# Patient Record
Sex: FEMALE | Race: WHITE
[De-identification: names, ages, dates, MRNs, and addresses within clinical notes are randomized per-mention and may not be internally consistent; named-entity substitution may affect disease eponyms.]

---

## 2019-09-09 ENCOUNTER — HOSPITAL ENCOUNTER (EMERGENCY)
Dept: HOSPITAL 72 - EMR | Age: 29
LOS: 3 days | Discharge: PSYCHIATRIC OR SUBSTANCE USE FACILITY | End: 2019-09-12
Payer: SELF-PAY

## 2019-09-09 VITALS — DIASTOLIC BLOOD PRESSURE: 92 MMHG | SYSTOLIC BLOOD PRESSURE: 113 MMHG

## 2019-09-09 VITALS — SYSTOLIC BLOOD PRESSURE: 155 MMHG | DIASTOLIC BLOOD PRESSURE: 98 MMHG

## 2019-09-09 VITALS — SYSTOLIC BLOOD PRESSURE: 147 MMHG | DIASTOLIC BLOOD PRESSURE: 104 MMHG

## 2019-09-09 VITALS — DIASTOLIC BLOOD PRESSURE: 93 MMHG | SYSTOLIC BLOOD PRESSURE: 147 MMHG

## 2019-09-09 VITALS — DIASTOLIC BLOOD PRESSURE: 90 MMHG | SYSTOLIC BLOOD PRESSURE: 128 MMHG

## 2019-09-09 VITALS — SYSTOLIC BLOOD PRESSURE: 143 MMHG | DIASTOLIC BLOOD PRESSURE: 93 MMHG

## 2019-09-09 VITALS — SYSTOLIC BLOOD PRESSURE: 141 MMHG | DIASTOLIC BLOOD PRESSURE: 95 MMHG

## 2019-09-09 VITALS — DIASTOLIC BLOOD PRESSURE: 89 MMHG | SYSTOLIC BLOOD PRESSURE: 129 MMHG

## 2019-09-09 VITALS — HEIGHT: 66 IN | WEIGHT: 130 LBS | BODY MASS INDEX: 20.89 KG/M2

## 2019-09-09 VITALS — DIASTOLIC BLOOD PRESSURE: 97 MMHG | SYSTOLIC BLOOD PRESSURE: 146 MMHG

## 2019-09-09 VITALS — SYSTOLIC BLOOD PRESSURE: 113 MMHG | DIASTOLIC BLOOD PRESSURE: 92 MMHG

## 2019-09-09 VITALS — SYSTOLIC BLOOD PRESSURE: 139 MMHG | DIASTOLIC BLOOD PRESSURE: 97 MMHG

## 2019-09-09 DIAGNOSIS — F10.920: ICD-10-CM

## 2019-09-09 DIAGNOSIS — N30.00: ICD-10-CM

## 2019-09-09 DIAGNOSIS — F15.10: ICD-10-CM

## 2019-09-09 DIAGNOSIS — R45.850: ICD-10-CM

## 2019-09-09 DIAGNOSIS — R45.851: ICD-10-CM

## 2019-09-09 DIAGNOSIS — F23: Primary | ICD-10-CM
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
## 2019-09-09 DIAGNOSIS — F14.10: ICD-10-CM

## 2019-09-09 LAB
ADD MANUAL DIFF: NO
ALBUMIN SERPL-MCNC: 3.8 G/DL (ref 3.4–5)
ALBUMIN/GLOB SERPL: 0.9 {RATIO} (ref 1–2.7)
ALP SERPL-CCNC: 67 U/L (ref 46–116)
ALT SERPL-CCNC: 38 U/L (ref 12–78)
ANION GAP SERPL CALC-SCNC: 12 MMOL/L (ref 5–15)
AST SERPL-CCNC: 142 U/L (ref 15–37)
BASOPHILS NFR BLD AUTO: 1.4 % (ref 0–2)
BILIRUB SERPL-MCNC: 0.2 MG/DL (ref 0.2–1)
BUN SERPL-MCNC: 4 MG/DL (ref 7–18)
CALCIUM SERPL-MCNC: 8.8 MG/DL (ref 8.5–10.1)
CHLORIDE SERPL-SCNC: 110 MMOL/L (ref 98–107)
CO2 SERPL-SCNC: 24 MMOL/L (ref 21–32)
CREAT SERPL-MCNC: 0.6 MG/DL (ref 0.55–1.3)
EOSINOPHIL NFR BLD AUTO: 6.1 % (ref 0–3)
ERYTHROCYTE [DISTWIDTH] IN BLOOD BY AUTOMATED COUNT: 9.7 % (ref 11.6–14.8)
GLOBULIN SER-MCNC: 4.3 G/DL
HCT VFR BLD CALC: 38.5 % (ref 37–47)
HGB BLD-MCNC: 13.9 G/DL (ref 12–16)
LYMPHOCYTES NFR BLD AUTO: 37.1 % (ref 20–45)
MCV RBC AUTO: 97 FL (ref 80–99)
MONOCYTES NFR BLD AUTO: 8.4 % (ref 1–10)
NEUTROPHILS NFR BLD AUTO: 46.9 % (ref 45–75)
PLATELET # BLD: 229 K/UL (ref 150–450)
POTASSIUM SERPL-SCNC: 3.4 MMOL/L (ref 3.5–5.1)
RBC # BLD AUTO: 3.95 M/UL (ref 4.2–5.4)
SODIUM SERPL-SCNC: 146 MMOL/L (ref 136–145)
WBC # BLD AUTO: 5.1 K/UL (ref 4.8–10.8)

## 2019-09-09 PROCEDURE — 87086 URINE CULTURE/COLONY COUNT: CPT

## 2019-09-09 PROCEDURE — 80329 ANALGESICS NON-OPIOID 1 OR 2: CPT

## 2019-09-09 PROCEDURE — 80053 COMPREHEN METABOLIC PANEL: CPT

## 2019-09-09 PROCEDURE — 36415 COLL VENOUS BLD VENIPUNCTURE: CPT

## 2019-09-09 PROCEDURE — 96372 THER/PROPH/DIAG INJ SC/IM: CPT

## 2019-09-09 PROCEDURE — 81003 URINALYSIS AUTO W/O SCOPE: CPT

## 2019-09-09 PROCEDURE — 99285 EMERGENCY DEPT VISIT HI MDM: CPT

## 2019-09-09 PROCEDURE — 85025 COMPLETE CBC W/AUTO DIFF WBC: CPT

## 2019-09-09 PROCEDURE — 81025 URINE PREGNANCY TEST: CPT

## 2019-09-09 PROCEDURE — 80307 DRUG TEST PRSMV CHEM ANLYZR: CPT

## 2019-09-09 NOTE — NUR
ED Nurse Note:



pt grew agitatd and began cursing at staff and acting combative. attemtped to 
give calm pt with verbal cues, this was unsuccessful

## 2019-09-09 NOTE — NUR
ED Nurse Note:



Informed ermd that pt was unable to make urine. ERMD aware. will await further 
orders

## 2019-09-09 NOTE — EMERGENCY ROOM REPORT
History of Present Illness


General


Chief Complaint:  Behavioral Complaint


Source:  Patient, EMS, Law Enforcement


 (Tesfaye Whipple MD)





Present Illness


HPI


Patient is a 29-year-old female brought in by EMS with [employer].  Patient 

reportedly had been behaving increasingly bizarre over the past few days.  

Patient had recently traveled to [place].  She had been noted to be more 

agitated than usual and apparently had been drinking alcohol heavily earlier in 

the day.  History is markedly limited by poor patient cooperation.  Patient was 

noted to been placed on a 5150 hold by [employer] for some reported danger self and 

to others.  Apparently the patient has made some threatening statements in 

English.


 (Tesfaye Whipple MD)


Allergies:  


Coded Allergies:  


     No Known Allergies (Unverified , 9/9/19)





Patient History


Past Medical History:  see triage record


Pregnant Now:  No


Reviewed Nursing Documentation:  PMH: Agreed; PSxH: Agreed (Tesfaye Whipple MD)





Nursing Documentation-PMH


Past Medical History Deferred:  Pt Cognitively Impaired


Past Medical History:  No History, Except For


History Of Psychiatric Problem:  Yes


 (Tesfaye Whipple MD)





Review of Systems


All Other Systems:  limited - by poor historian


 (Tesfaye Whipple MD)





Physical Exam





Vital Signs








  Date Time  Temp Pulse Resp B/P (MAP) Pulse Ox O2 Delivery O2 Flow Rate FiO2


 


9/9/19 19:54 98.1 99 18 147/104 (118) 94 Room Air  








Sp02 EP Interpretation:  reviewed, normal


General Appearance:  alert/responsive, no apparent distress, GCS 15, non-toxic


Head:  atraumatic


Eyes:  PERRL, lids + conjunctiva normal


ENT:  hearing intact, no angioedema


Neck:  supple/symm/no masses, no meningismus


Respiratory:  effort normal, no wheezing, chest symmetrical


Cardiovascular:  regular rate, rhythm, no edema


Cardiovascular #2:  2+ carotid (R), 2+ carotid (L), 2+ dorsalis pedis (R), 2+ 

dorsalis pedis (L)


Gastrointestinal:  non-tender, no mass, non-distended, no rebound/guarding, 

normal bowel sounds


Musculoskeletal:  gait & station normal, strength & tone normal, normal ROM, non

-tender


Neurologic:  normal inspection, CN II-XII intact, oriented x3, sensory intact, 

normal speech, other - slurred speech


Skin:  no rash, well hydrated


Lymphatic:  normal inspection


 (Tesfaye Whipple MD)





Medical Decision Making


Diagnostic Impression:  


 Primary Impression:  


 Alcohol intoxication


 Qualified Codes:  F10.920 - Alcohol use, unspecified with intoxication, 

uncomplicated


 Additional Impressions:  


 Psychosis


 Qualified Codes:  F23 - Brief psychotic disorder


 UTI (urinary tract infection)


 Qualified Codes:  N30.00 - Acute cystitis without hematuria


 Cocaine abuse


 Methamphetamine abuse


ER Course


 Patient presented for increased bizarre behavior.  Differential diagnoses 

include substance abuse, psychosis, bipolar disorder, depression, malingering.  

Patient was noted to have prior history of because of complexity of patient's 

case laboratory tests and imaging studies were ordered.  Alcohol use.  Blood 

alcohol was noted to be markedly elevated laboratory testing.  Patient will 

require repeat blood alcohol testing prior to medical clearance for psychiatric 

placement.She was given Haldol due to agitation briefly placed in restraints.





Labs








Test


  9/9/19


20:20


 


White Blood Count


  5.1 K/UL


(4.8-10.8)


 


Red Blood Count


  3.95 M/UL


(4.20-5.40)


 


Hemoglobin


  13.9 G/DL


(12.0-16.0)


 


Hematocrit


  38.5 %


(37.0-47.0)


 


Mean Corpuscular Volume 97 FL (80-99) 


 


Mean Corpuscular Hemoglobin


  35.3 PG


(27.0-31.0)


 


Mean Corpuscular Hemoglobin


Concent 36.2 G/DL


(32.0-36.0)


 


Red Cell Distribution Width


  9.7 %


(11.6-14.8)


 


Platelet Count


  229 K/UL


(150-450)


 


Mean Platelet Volume


  6.6 FL


(6.5-10.1)


 


Neutrophils (%) (Auto)


  46.9 %


(45.0-75.0)


 


Lymphocytes (%) (Auto)


  37.1 %


(20.0-45.0)


 


Monocytes (%) (Auto)


  8.4 %


(1.0-10.0)


 


Eosinophils (%) (Auto)


  6.1 %


(0.0-3.0)


 


Basophils (%) (Auto)


  1.4 %


(0.0-2.0)


 


Sodium Level


  146 MMOL/L


(136-145)


 


Potassium Level


  3.4 MMOL/L


(3.5-5.1)


 


Chloride Level


  110 MMOL/L


()


 


Carbon Dioxide Level


  24 MMOL/L


(21-32)


 


Anion Gap


  12 mmol/L


(5-15)


 


Blood Urea Nitrogen 4 mg/dL (7-18) 


 


Creatinine


  0.6 MG/DL


(0.55-1.30)


 


Estimat Glomerular Filtration


Rate > 60 mL/min


(>60)


 


Glucose Level


  109 MG/DL


()


 


Calcium Level


  8.8 MG/DL


(8.5-10.1)


 


Total Bilirubin


  0.2 MG/DL


(0.2-1.0)


 


Aspartate Amino Transf


(AST/SGOT) 142 U/L


(15-37)


 


Alanine Aminotransferase


(ALT/SGPT) 38 U/L (12-78) 


 


 


Alkaline Phosphatase


  67 U/L


()


 


Total Protein


  8.1 G/DL


(6.4-8.2)


 


Albumin


  3.8 G/DL


(3.4-5.0)


 


Globulin 4.3 g/dL 


 


Albumin/Globulin Ratio 0.9 (1.0-2.7) 


 


Salicylates Level


  0.9 ug/mL


(2.8-20)


 


Acetaminophen Level


  < 2 MCG/ML


(10-30)


 


Serum Alcohol 341 mg/dL 








 (Tesfaye Whipple MD)


ER Course


This patient was signed out to me.  She was placed on a 5150 by police for 

danger to self and others.  Currently, she is in English that she wants to kill 

herself and other people.  She slept through the night.  She she is walking 

around now without any difficulty.  Will need to repeat blood alcohol for 

medical clearance.  She is no longer suicidal or homicidal.  Since she is on a 

5150 hold, once medically clear can be sent to her psychiatric facility or 

evaluated by psychiatrist here for removal of 5150.


 (Cody Hung MD)


ER Course


Patient was signed out to me for final disposition.  I have evaluated patient.  

Patient is currently medically cleared for psychiatric evaluation and 

admission.  Patient is currently on a 5150.


 (Joesph Marr MD)


ER Course


Please see above note.


Patient had been accepted at [facility] but name on 5150 was incorrect.


[employer] here correcting 5150.


Patient calm and stable for transfer.


 (Antonio Christensen MD)





Last Vital Signs








  Date Time  Temp Pulse Resp B/P (MAP) Pulse Ox O2 Delivery O2 Flow Rate FiO2


 


9/9/19 21:42  99 19  98 Room Air  


 


9/9/19 20:00 98.1   147/104    








Status:  improved


 (Tesfaye Whipple MD)


Status:  improved


 (Cody Hung MD)





Last Vital Signs








  Date Time  Temp Pulse Resp B/P (MAP) Pulse Ox O2 Delivery O2 Flow Rate FiO2


 


9/12/19 00:15 98.1 68 16 135/86 98 Room Air  


 


9/11/19 23:08        88








Status:  improved


 (Antonio Christensen MD)


Disposition:  XFER TO PSYCH HOSP/UNIT


Condition:  Stable


Scripts


Cephalexin* (KEFLEX*) 500 Mg Capsule


500 MG ORAL TID, #21 CAP


   Prov: Cody Hung MD         9/10/19


Referrals:  


NOT CHOSEN IPA/MD,REFERRING (PCP)











Tesfaye Whipple MD Sep 9, 2019 22:23


Cody Hung MD Sep 10, 2019 03:39


Joesph Marr MD Sep 10, 2019 08:06


Antonio Christensen MD Sep 11, 2019 18:32

## 2019-09-09 NOTE — NUR
ED Nurse Note:



pt intensified loudness of voice and began removing clothing agressively. emt, 
house supervisor, and security were at bedside. pt is unconsolable. awaiting 
further orders from ermd

## 2019-09-09 NOTE — NUR
-------------------------------------------------------------------------------

           *** Note brenda in EDM - 09/10/19 at 0619 by ELVIA ***            

-------------------------------------------------------------------------------

ED Nurse Note:



pt intensified loudness of voice and began removing clothing agressively. emt, 
house supervisor, and security were at bedside. pt is unconsolable. [facility]

## 2019-09-10 VITALS — SYSTOLIC BLOOD PRESSURE: 133 MMHG | DIASTOLIC BLOOD PRESSURE: 84 MMHG

## 2019-09-10 VITALS — DIASTOLIC BLOOD PRESSURE: 78 MMHG | SYSTOLIC BLOOD PRESSURE: 140 MMHG

## 2019-09-10 VITALS — DIASTOLIC BLOOD PRESSURE: 84 MMHG | SYSTOLIC BLOOD PRESSURE: 135 MMHG

## 2019-09-10 VITALS — DIASTOLIC BLOOD PRESSURE: 78 MMHG | SYSTOLIC BLOOD PRESSURE: 134 MMHG

## 2019-09-10 VITALS — SYSTOLIC BLOOD PRESSURE: 135 MMHG | DIASTOLIC BLOOD PRESSURE: 82 MMHG

## 2019-09-10 VITALS — SYSTOLIC BLOOD PRESSURE: 122 MMHG | DIASTOLIC BLOOD PRESSURE: 83 MMHG

## 2019-09-10 VITALS — SYSTOLIC BLOOD PRESSURE: 142 MMHG | DIASTOLIC BLOOD PRESSURE: 88 MMHG

## 2019-09-10 VITALS — SYSTOLIC BLOOD PRESSURE: 142 MMHG | DIASTOLIC BLOOD PRESSURE: 81 MMHG

## 2019-09-10 VITALS — DIASTOLIC BLOOD PRESSURE: 89 MMHG | SYSTOLIC BLOOD PRESSURE: 144 MMHG

## 2019-09-10 LAB
APPEARANCE UR: (no result)
APTT PPP: YELLOW S
GLUCOSE UR STRIP-MCNC: NEGATIVE MG/DL
KETONES UR QL STRIP: (no result)
LEUKOCYTE ESTERASE UR QL STRIP: (no result)
NITRITE UR QL STRIP: POSITIVE
PH UR STRIP: 6 [PH] (ref 4.5–8)
PROT UR QL STRIP: (no result)
SP GR UR STRIP: 1.02 (ref 1–1.03)
UROBILINOGEN UR-MCNC: NORMAL MG/DL (ref 0–1)

## 2019-09-10 NOTE — NUR
ED Nurse Note:



pt is able to ambulated to bathroom and urinate. pt shows no acutes signs of 
distress. pt denies pain at the moment. pt denies SI and ideation to harm 
others. pt is calm cooperative and went back to bed to sleep.

## 2019-09-10 NOTE — NUR
ED Nurse Note:



pt is currently asleep at the moment, sitter at bedside. vss, will continue 
monitor pt and await further orders.

## 2019-09-10 NOTE — NUR
ED Nurse Note: Patient nursed in bed 7 sitter present Albertina. Patient woken and 
provided breakfast. Patient hungry and currently eating. Vital signs rechecked 
patient denies any pain. Patient has commenced menstrual cycle and hygiene 
products provided and patient escorted to bathroom by sitter. Saftey checklist 
docuent completed.

## 2019-09-10 NOTE — NUR
STILL WAITING FOR REEMA TO CME AND CHANGE THE NAME ON THE HOLD SINCE HER NAME  
SPELLED WRONG ON THE HOLD

## 2019-09-10 NOTE — NUR
ED Nurse Note: Patient denies thoughts of suicide/homicide. Patients first 
che is Rissian but speaks English as second language. Patient ddenies any 
cause of stress in [facility] prompting visit to [place]. States she currently is singe 
states she had a partner but has been single for a long tie. Denies family 
problems r problems with friend with whom she is visiting. Patient appears 
guarded when questioned.

## 2019-09-10 NOTE — NUR
ED Nurse Note:



Recieved report to resume care, pt in bed awake, alert and oriented x 4, pt is 
calm and cooperative, on 5150 hold for dts, pt is waiting for transfer to psych 
facility:Jt, report has been gven, pt 5150 hold for was completed by  with 
incorrect name, waiting for them to return for new form then ambulance witll be 
called for eta, report has already been given, sitter at bedside for protocol, 
will continue to closely monitor.

## 2019-09-10 NOTE — NUR
ER Transfer NOTE:

Patient is cleared to be transferred ERMD, pt is aox4, on room air, with stable 
vital signs. pt was given transfer and prescription instructions. pt is able to 
ambulate with steady gait. pt took all belongings. Handover to EMT and to [employer] 
RN at [facility]

## 2019-09-10 NOTE — NUR
ED Nurse Note:



pt is cooperative and toleration medication well. pt is currently resting in 
bed. pt denies food at the moment and water was provided.

## 2019-09-11 VITALS — DIASTOLIC BLOOD PRESSURE: 84 MMHG | SYSTOLIC BLOOD PRESSURE: 138 MMHG

## 2019-09-11 VITALS — SYSTOLIC BLOOD PRESSURE: 135 MMHG | DIASTOLIC BLOOD PRESSURE: 86 MMHG

## 2019-09-11 VITALS — SYSTOLIC BLOOD PRESSURE: 144 MMHG | DIASTOLIC BLOOD PRESSURE: 84 MMHG

## 2019-09-11 VITALS — DIASTOLIC BLOOD PRESSURE: 80 MMHG | SYSTOLIC BLOOD PRESSURE: 130 MMHG

## 2019-09-11 VITALS — SYSTOLIC BLOOD PRESSURE: 138 MMHG | DIASTOLIC BLOOD PRESSURE: 82 MMHG

## 2019-09-11 VITALS — DIASTOLIC BLOOD PRESSURE: 90 MMHG | SYSTOLIC BLOOD PRESSURE: 137 MMHG

## 2019-09-11 VITALS — SYSTOLIC BLOOD PRESSURE: 136 MMHG | DIASTOLIC BLOOD PRESSURE: 86 MMHG

## 2019-09-11 VITALS — DIASTOLIC BLOOD PRESSURE: 83 MMHG | SYSTOLIC BLOOD PRESSURE: 142 MMHG

## 2019-09-11 NOTE — NUR
ED Nurse Note: Patient nursed in ortho sitter present. Vitals recorded. 
Breakfast eaten. Patient advising she usually talks to her mum daily via 
internet. Patient has no pain. Aside of concern reagding contct with mother no 
other concern relayed. Denies thoughts of self harm or om,icidal thoughts. 
Further feminine hygiene products provided. Sitter present Fallon.

## 2019-09-11 NOTE — NUR
ER Nurse Note:



Report received from JULIO Kumari. Pt calm, cooperative, VSS, RA, no signs of 
distress. Pt is on 5150 hold. Denies pain, n/v, resp distress. Pt is aware of 
placement. All safety measures met; will conitnue to bettina.

## 2019-09-11 NOTE — NUR
ED Nurse Note: Patient remains in room ortho accompanied by sitter. Vitals 
signs checked. Patient sleeping intermittently. Still awaiting transfer. 
Evening meal ordered. Patient drinking fluids and ate a sandwich for snack.

## 2019-09-11 NOTE — NUR
ED Nurse Note: Patient remians in room ortho accompanied by sitter. Patient 
intermittently sleeping.

## 2019-09-11 NOTE — NUR
ER Nurse Note:



Spoke with Jyoit at [facility]; stated they will accept her. Pt calm, coopetive. 
Pt asleep, no signs of distress. Pt aware of transfer. All safety measrues met; 
will continue to montior.

## 2019-09-11 NOTE — NUR
ED Nurse Note:

RECEIVED PATIENT FROM JULIO MOORE. PATIENT SLEEPING IN BED WITH NAD. RESPIRATIONS 
EVEN AND UNLABOURED. LOW RISK ENVIRONMENT OBSERVED. DECREASED ENVIRONMENTAL 
STIMULI. WILL CONTINUE TO MONITOR.

## 2019-09-11 NOTE — NUR
ED Nurse Note: Patient remains in room ortho accompanied by sitter. Vitals 
signs checked. Patient sleeping intermittently. Still awaiting transfer.

## 2019-09-11 NOTE — NUR
ED Nurse Note:

Endorsed pt to Irving Rivera RN. Pt resting comfortably with eyes closed, no signs 
of distress

## 2019-09-11 NOTE — NUR
ED Nurse Note:

PATIENT RESTING COMFORTABLY WITH NAD. AO4. PROVIDED PATIENT WITH NOURISHMENT. 
PT WENT BACK TO SLEEP.

## 2019-09-11 NOTE — NUR
ER Nurse Note:



Spoke with Barbara at [facility] to fax over labs. CN notifed. Pt stable, no signs of 
distress. Pt asleep and easily arousable. Pt denies pain. All orders completed. 
All safety measures met, will continue to montior.

## 2019-09-12 VITALS — SYSTOLIC BLOOD PRESSURE: 135 MMHG | DIASTOLIC BLOOD PRESSURE: 86 MMHG

## 2019-09-12 NOTE — NUR
ER Nurse Note:



Pt departed facility. All orders completed per ERMD orders. All belongings 
returned and given to EMS. Pt stable, no signs of distress, cooperative.

## 2022-04-26 ENCOUNTER — HOSPITAL ENCOUNTER (INPATIENT)
Dept: HOSPITAL 54 - ER | Age: 32
LOS: 4 days | Discharge: HOME | DRG: 896 | End: 2022-04-30
Attending: INTERNAL MEDICINE | Admitting: NURSE PRACTITIONER
Payer: COMMERCIAL

## 2022-04-26 VITALS — HEIGHT: 65 IN | BODY MASS INDEX: 20.83 KG/M2 | WEIGHT: 125 LBS

## 2022-04-26 DIAGNOSIS — K85.90: ICD-10-CM

## 2022-04-26 DIAGNOSIS — K70.10: ICD-10-CM

## 2022-04-26 DIAGNOSIS — K20.90: ICD-10-CM

## 2022-04-26 DIAGNOSIS — K29.70: ICD-10-CM

## 2022-04-26 DIAGNOSIS — N39.0: ICD-10-CM

## 2022-04-26 DIAGNOSIS — E87.6: ICD-10-CM

## 2022-04-26 DIAGNOSIS — F10.129: Primary | ICD-10-CM

## 2022-04-26 DIAGNOSIS — I10: ICD-10-CM

## 2022-04-26 DIAGNOSIS — Z20.822: ICD-10-CM

## 2022-04-26 DIAGNOSIS — M62.82: ICD-10-CM

## 2022-04-26 DIAGNOSIS — N17.0: ICD-10-CM

## 2022-04-26 DIAGNOSIS — E83.51: ICD-10-CM

## 2022-04-26 DIAGNOSIS — B96.89: ICD-10-CM
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
## 2022-04-26 DIAGNOSIS — R13.10: ICD-10-CM

## 2022-04-26 DIAGNOSIS — E87.2: ICD-10-CM

## 2022-04-26 DIAGNOSIS — G92.9: ICD-10-CM

## 2022-04-26 LAB
ALBUMIN SERPL BCP-MCNC: (no result) G/DL (ref 3.4–5)
ALBUMIN SERPL BCP-MCNC: 2 G/DL (ref 3.4–5)
ALP SERPL-CCNC: (no result) U/L (ref 46–116)
ALP SERPL-CCNC: 166 U/L (ref 46–116)
ALT SERPL W P-5'-P-CCNC: (no result) U/L (ref 12–78)
ALT SERPL W P-5'-P-CCNC: 89 U/L (ref 12–78)
AST SERPL W P-5'-P-CCNC: (no result) U/L (ref 15–37)
AST SERPL W P-5'-P-CCNC: 710 U/L (ref 15–37)
BASOPHILS # BLD AUTO: 0.1 K/UL (ref 0–0.2)
BASOPHILS NFR BLD AUTO: 0.6 % (ref 0–2)
BILIRUB DIRECT SERPL-MCNC: (no result) MG/DL (ref 0–0.2)
BILIRUB DIRECT SERPL-MCNC: 1.1 MG/DL (ref 0–0.2)
BILIRUB SERPL-MCNC: (no result) MG/DL (ref 0.2–1)
BILIRUB SERPL-MCNC: 1.6 MG/DL (ref 0.2–1)
BILIRUB UR QL STRIP: (no result)
BUN SERPL-MCNC: 14 MG/DL (ref 7–18)
CALCIUM SERPL-MCNC: 7.1 MG/DL (ref 8.5–10.1)
CHLORIDE SERPL-SCNC: 91 MMOL/L (ref 98–107)
CO2 SERPL-SCNC: 6 MMOL/L (ref 21–32)
COLOR UR: (no result)
CREAT SERPL-MCNC: 1.5 MG/DL (ref 0.6–1.3)
EOSINOPHIL NFR BLD AUTO: 0.1 % (ref 0–6)
GLUCOSE SERPL-MCNC: 224 MG/DL (ref 74–106)
GLUCOSE UR STRIP-MCNC: NEGATIVE MG/DL
HCT VFR BLD AUTO: 52 % (ref 33–45)
HGB BLD-MCNC: 16.8 G/DL (ref 11.5–14.8)
LEUKOCYTE ESTERASE UR QL STRIP: NEGATIVE
LIPASE SERPL-CCNC: (no result) U/L (ref 73–393)
LIPASE SERPL-CCNC: 160 U/L (ref 73–393)
LYMPHOCYTES NFR BLD AUTO: 3.9 K/UL (ref 0.8–4.8)
LYMPHOCYTES NFR BLD AUTO: 39 % (ref 20–44)
LYMPHOCYTES NFR BLD MANUAL: 11 % (ref 16–48)
MAGNESIUM SERPL-MCNC: 1.6 MG/DL (ref 1.8–2.4)
MCHC RBC AUTO-ENTMCNC: 32 G/DL (ref 31–36)
MCV RBC AUTO: 112 FL (ref 82–100)
MONOCYTES NFR BLD AUTO: 0.3 K/UL (ref 0.1–1.3)
MONOCYTES NFR BLD AUTO: 2.9 % (ref 2–12)
MONOCYTES NFR BLD MANUAL: 3 % (ref 0–11)
NEUTROPHILS # BLD AUTO: 5.7 K/UL (ref 1.8–8.9)
NEUTROPHILS NFR BLD AUTO: 57.4 % (ref 43–81)
NEUTS BAND NFR BLD MANUAL: 1 % (ref 0–5)
NEUTS SEG NFR BLD MANUAL: 85 % (ref 42–76)
NITRITE UR QL STRIP: NEGATIVE
PH UR STRIP: 5.5 [PH] (ref 5–8)
PHOSPHATE SERPL-MCNC: 5.5 MG/DL (ref 2.5–4.9)
PLATELET # BLD AUTO: 121 K/UL (ref 150–450)
POTASSIUM SERPL-SCNC: 2.8 MMOL/L (ref 3.5–5.1)
PROT SERPL-MCNC: (no result) G/DL (ref 6.4–8.2)
PROT SERPL-MCNC: 5.9 G/DL (ref 6.4–8.2)
PROT UR QL STRIP: 100 MG/DL
RBC # BLD AUTO: 4.67 MIL/UL (ref 4–5.2)
SODIUM SERPL-SCNC: 132 MMOL/L (ref 136–145)
UROBILINOGEN UR STRIP-MCNC: 1 EU/DL
WBC #/AREA URNS HPF: (no result) /HPF (ref 0–3)
WBC NRBC COR # BLD AUTO: 10 K/UL (ref 4.3–11)

## 2022-04-26 PROCEDURE — G0378 HOSPITAL OBSERVATION PER HR: HCPCS

## 2022-04-26 PROCEDURE — G0480 DRUG TEST DEF 1-7 CLASSES: HCPCS

## 2022-04-26 PROCEDURE — C9803 HOPD COVID-19 SPEC COLLECT: HCPCS

## 2022-04-26 PROCEDURE — C9113 INJ PANTOPRAZOLE SODIUM, VIA: HCPCS

## 2022-04-26 NOTE — NUR
BIB RA 37 C/O UPPER ABDOMINAL PAIN, NAUSEA AND DIARRHEA X 1 DAY. AAOX4, 
BREATHING EVEN AND UNLABORED, PULSES 2+ BILATERALLY. HANDS COLD TO TOUCH. ON 
MONITOR. VS STABLE.

## 2022-04-27 VITALS — SYSTOLIC BLOOD PRESSURE: 94 MMHG | DIASTOLIC BLOOD PRESSURE: 74 MMHG

## 2022-04-27 VITALS — DIASTOLIC BLOOD PRESSURE: 75 MMHG | SYSTOLIC BLOOD PRESSURE: 118 MMHG

## 2022-04-27 VITALS — SYSTOLIC BLOOD PRESSURE: 98 MMHG | DIASTOLIC BLOOD PRESSURE: 64 MMHG

## 2022-04-27 VITALS — SYSTOLIC BLOOD PRESSURE: 96 MMHG | DIASTOLIC BLOOD PRESSURE: 70 MMHG

## 2022-04-27 LAB
ALBUMIN SERPL BCP-MCNC: 2.1 G/DL (ref 3.4–5)
ALP SERPL-CCNC: 155 U/L (ref 46–116)
ALT SERPL W P-5'-P-CCNC: 108 U/L (ref 12–78)
AST SERPL W P-5'-P-CCNC: 1222 U/L (ref 15–37)
BASOPHILS # BLD AUTO: 0.1 K/UL (ref 0–0.2)
BASOPHILS NFR BLD AUTO: 0.7 % (ref 0–2)
BILIRUB DIRECT SERPL-MCNC: 0.9 MG/DL (ref 0–0.2)
BILIRUB SERPL-MCNC: 2.7 MG/DL (ref 0.2–1)
BUN SERPL-MCNC: 4 MG/DL (ref 7–18)
BUN SERPL-MCNC: 8 MG/DL (ref 7–18)
CALCIUM SERPL-MCNC: 6 MG/DL (ref 8.5–10.1)
CALCIUM SERPL-MCNC: 6.3 MG/DL (ref 8.5–10.1)
CHLORIDE SERPL-SCNC: 93 MMOL/L (ref 98–107)
CHLORIDE SERPL-SCNC: 95 MMOL/L (ref 98–107)
CHOLEST SERPL-MCNC: 167 MG/DL (ref ?–200)
CO2 SERPL-SCNC: 19 MMOL/L (ref 21–32)
CO2 SERPL-SCNC: 9 MMOL/L (ref 21–32)
CREAT SERPL-MCNC: 0.6 MG/DL (ref 0.6–1.3)
CREAT SERPL-MCNC: 0.8 MG/DL (ref 0.6–1.3)
EOSINOPHIL NFR BLD AUTO: 0.2 % (ref 0–6)
GLUCOSE SERPL-MCNC: 171 MG/DL (ref 74–106)
GLUCOSE SERPL-MCNC: 234 MG/DL (ref 74–106)
HCT VFR BLD AUTO: 36 % (ref 33–45)
HDLC SERPL-MCNC: 11 MG/DL (ref 40–60)
HGB BLD-MCNC: 11.8 G/DL (ref 11.5–14.8)
LDLC SERPL DIRECT ASSAY-MCNC: 49 MG/DL (ref 0–99)
LIPASE SERPL-CCNC: 123 U/L (ref 73–393)
LYMPHOCYTES NFR BLD AUTO: 1.6 K/UL (ref 0.8–4.8)
LYMPHOCYTES NFR BLD AUTO: 20.4 % (ref 20–44)
MAGNESIUM SERPL-MCNC: 1.6 MG/DL (ref 1.8–2.4)
MCHC RBC AUTO-ENTMCNC: 33 G/DL (ref 31–36)
MCV RBC AUTO: 110 FL (ref 82–100)
MONOCYTES NFR BLD AUTO: 0.3 K/UL (ref 0.1–1.3)
MONOCYTES NFR BLD AUTO: 4.1 % (ref 2–12)
NEUTROPHILS # BLD AUTO: 5.9 K/UL (ref 1.8–8.9)
NEUTROPHILS NFR BLD AUTO: 74.6 % (ref 43–81)
PHOSPHATE SERPL-MCNC: 1.8 MG/DL (ref 2.5–4.9)
PLATELET # BLD AUTO: 61 K/UL (ref 150–450)
POTASSIUM SERPL-SCNC: 2.7 MMOL/L (ref 3.5–5.1)
POTASSIUM SERPL-SCNC: 3.2 MMOL/L (ref 3.5–5.1)
PROT SERPL-MCNC: 6.1 G/DL (ref 6.4–8.2)
RBC # BLD AUTO: 3.29 MIL/UL (ref 4–5.2)
SODIUM SERPL-SCNC: 126 MMOL/L (ref 136–145)
SODIUM SERPL-SCNC: 128 MMOL/L (ref 136–145)
TRIGL SERPL-MCNC: 1112 MG/DL (ref 30–150)
WBC NRBC COR # BLD AUTO: 7.8 K/UL (ref 4.3–11)

## 2022-04-27 RX ADMIN — DEXTROSE MONOHYDRATE SCH MLS/HR: 50 INJECTION, SOLUTION INTRAVENOUS at 20:38

## 2022-04-27 RX ADMIN — MUPIROCIN SCH GM: 20 OINTMENT TOPICAL at 20:48

## 2022-04-27 RX ADMIN — CHLORDIAZEPOXIDE HYDROCHLORIDE SCH MG: 25 CAPSULE ORAL at 16:27

## 2022-04-27 RX ADMIN — SODIUM CHLORIDE PRN MLS/HR: 9 INJECTION, SOLUTION INTRAVENOUS at 15:12

## 2022-04-27 NOTE — NUR
RN OPENING NOTES



RECEIVED PATIENT IN BED A/OX2,PERIOD OF CONFUSION Tuvaluan SPEAKING ON ROOM AIR BERONICA WELL. NO 
SIGN SOB/DISTRESS NOTED.LFA #22G AND RAC #20G, INTACT AND PATENT. SAFETY MEASURE 
IMPLEMENTED; BED LOCKED IN LOW POSITION; SIDE RAILSX3 UP.CALL LIGHT WITHIN REACH; WILL 
CONTINUE TO MONITOR.

## 2022-04-27 NOTE — NUR
RN NOTES



CRITICAL RESULT OF POTASSIUM 2.7, CO2 9. RELAYED TO DR. RODRIGUEZ. WITH ORDERS MADE AND 
CARRIED OUT.

## 2022-04-27 NOTE — NUR
RN CLOSING NOTES

PATIENT SLEEPING IN BED, A/OX2, NOTED WITH PERIODS OF CONFUSION; Citizen of [place] SPEAKING; 
TOLERATING ROOM AIR WELL; L FA #22G INTACT, ONGOING NS X 125 CC/HR, INFUSING WELL, NO S/SX 
OF INFILTRATION NOTED; R AC #20G, INTACT AND PATENT; NO S/S OF REDNESS OR INFILTRATION; 
SAFETY PRECAUTIONS IMPLEMENTED; BED LOCKED IN LOW POSITION; SIDE RAILSX3, CALL LIGHT WITHIN 
REACH; ALL NEEDS ATTENDED AND MET, DUE MEDS GIVEN AS ORDERED. WILL ENDORSE TO ONCOMING SHIFT 
FOR FLORA.

## 2022-04-27 NOTE — NUR
TELE RN ADMISSION NOTES



PT ARRIVED TO UNIT VIA [facility]GG, ACCOMPANIED BY 2 ER STAFF; PT A/OX1-2, PT IS CONFUSED; BLOOD 
ALCOHOL LEVEL 288,PATIENT COMPLAINING OF ABDOMINAL PAIN; PT AMBULATED TO BATHROOM WITH 
UNSTEADY GAIT, ASSISTED TO RESTROOM; PT VITAL SIGNS TAKEN; BELONGINGS CHECKED; SKIN 
ASSESSMENT DONE, SKIN INTACT; PT IS POOR HISTORIAN OF MEDICAL HX D/T ALTERED MENTAL STATUS; 
CHARGE NURSE AWARE; PER ER, PT DOES NOT WANT COVID VAX AND UNABLE TO OBTAIN SOME INFORMATION 
D/T MENTAL STATUS; L FA #22G INTACT, TOLERATING IVF WELL; NO S/S OF REDNESS OR INFILTRATION; 
PT ORIENTED TO STAFF AND UNIT, PT FALLING ASLEEP; BUT EASILY AROUSABLE; SAFETY PRECAUTIONS 
IMPLEMENTED; BED LOCKED IN LOW POSITION; SIDE RAILSX3, CALL LIGHT WITHIN REACH; WILL CONT TO 
MONITOR. 

-------------------------------------------------------------------------------

Addendum: 04/27/22 at 0231 by FATOU BASURTO RN

-------------------------------------------------------------------------------

TELE MONITOR READS SINUS TACHY 115BPM

## 2022-04-27 NOTE — NUR
TELE RN NOTES



PT RECEIVING SODIUM BICARB DRIP, STARTED IN ER; PT TOLERATING FLUIDS WELL; WILL CONT TO 
MONITOR

## 2022-04-27 NOTE — NUR
TELE RN CLOSING NOTES



PT SLEEPING IN BED, A/OX1-2, PT IS CONFUSED; Filipino SPEAKING; PT SLEEPING IN BED 
COMFORTABLY, TOLERATING ROOM AIR WELL; SATTING %; L FA #22G INTACT, TOLERATING IVF 
WELL; R AC #20G, INTACT AND PATENT; NO S/S OF REDNESS OR INFILTRATION; PT ORIENTED TO STAFF 
AND UNIT, ALL NEEDS RENDERED; SAFETY PRECAUTIONS IMPLEMENTED; BED LOCKED IN LOW POSITION; 
SIDE RAILSX3, CALL LIGHT WITHIN REACH; WILL ENDORSE CONTINUITY OF CARE TO ONCOMING SHIFT 

-------------------------------------------------------------------------------

Addendum: 04/27/22 at 0649 by FATOU BASURTO RN

-------------------------------------------------------------------------------

TELE MONITOR READS SINUS TACHY 110 BPM

## 2022-04-27 NOTE — NUR
TELE RN OPENING NOTES

RECEIVED PATIENT SLEEPING IN BED, A/OX2, NOTED WITH PERIODS OF CONFUSION; Emirati SPEAKING; 
TOLERATING ROOM AIR WELL; L FA #22G INTACT, TOLERATING IVF WELL; R AC #20G, INTACT AND 
PATENT; NO S/S OF REDNESS OR INFILTRATION; ON TELE READING SHOWING ST HR . SAFETY 
PRECAUTIONS IMPLEMENTED; BED LOCKED IN LOW POSITION; SIDE RAILSX3, CALL LIGHT WITHIN REACH; 
WILL CONTINUE TO MONITOR PATIENT ACCORDINGLY.

## 2022-04-27 NOTE — NUR
TELE RN NOTES



PT DOES NOT WANT TO REMOVE CLOTHING TO CHANGE INTO PT GOWN; PT DROWSY AND CONTINUOUSLY 
NODDING HEAD WHEN ASKED QUESTIONS; PERSISTENT ON WANTING WATER; PT RE-EDUCATED ON NPO 
STATUS, PT CONFUSED KEEPS ASKING FOR WATER; CHARGE NURSE AWARE; WILL CONT TO MONITOR, WILL 
INFORM DAY SHIFT REGARDING PT NOT WANTING TO BE IN PT GOWN

## 2022-04-28 VITALS — DIASTOLIC BLOOD PRESSURE: 76 MMHG | SYSTOLIC BLOOD PRESSURE: 112 MMHG

## 2022-04-28 VITALS — DIASTOLIC BLOOD PRESSURE: 68 MMHG | SYSTOLIC BLOOD PRESSURE: 109 MMHG

## 2022-04-28 VITALS — DIASTOLIC BLOOD PRESSURE: 77 MMHG | SYSTOLIC BLOOD PRESSURE: 132 MMHG

## 2022-04-28 VITALS — DIASTOLIC BLOOD PRESSURE: 69 MMHG | SYSTOLIC BLOOD PRESSURE: 97 MMHG

## 2022-04-28 LAB
BASOPHILS # BLD AUTO: 0 K/UL (ref 0–0.2)
BASOPHILS NFR BLD AUTO: 0.3 % (ref 0–2)
BUN SERPL-MCNC: 5 MG/DL (ref 7–18)
CALCIUM SERPL-MCNC: 6.7 MG/DL (ref 8.5–10.1)
CHLORIDE SERPL-SCNC: 100 MMOL/L (ref 98–107)
CO2 SERPL-SCNC: 23 MMOL/L (ref 21–32)
CREAT SERPL-MCNC: 0.6 MG/DL (ref 0.6–1.3)
EOSINOPHIL NFR BLD AUTO: 0.8 % (ref 0–6)
GLUCOSE SERPL-MCNC: 173 MG/DL (ref 74–106)
HCT VFR BLD AUTO: 34 % (ref 33–45)
HGB BLD-MCNC: 11.8 G/DL (ref 11.5–14.8)
LYMPHOCYTES NFR BLD AUTO: 1.1 K/UL (ref 0.8–4.8)
LYMPHOCYTES NFR BLD AUTO: 16.7 % (ref 20–44)
LYMPHOCYTES NFR BLD MANUAL: 33 % (ref 16–48)
MAGNESIUM SERPL-MCNC: 2.1 MG/DL (ref 1.8–2.4)
MCHC RBC AUTO-ENTMCNC: 35 G/DL (ref 31–36)
MCV RBC AUTO: 109 FL (ref 82–100)
MONOCYTES NFR BLD AUTO: 0.3 K/UL (ref 0.1–1.3)
MONOCYTES NFR BLD AUTO: 5.4 % (ref 2–12)
MONOCYTES NFR BLD MANUAL: 1 % (ref 0–11)
NEUTROPHILS # BLD AUTO: 4.8 K/UL (ref 1.8–8.9)
NEUTROPHILS NFR BLD AUTO: 76.8 % (ref 43–81)
NEUTS SEG NFR BLD MANUAL: 64 % (ref 42–76)
PHOSPHATE SERPL-MCNC: 1.3 MG/DL (ref 2.5–4.9)
PLATELET # BLD AUTO: 163 K/UL (ref 150–450)
POTASSIUM SERPL-SCNC: 4.3 MMOL/L (ref 3.5–5.1)
RBC # BLD AUTO: 3.14 MIL/UL (ref 4–5.2)
SODIUM SERPL-SCNC: 131 MMOL/L (ref 136–145)
VARIANT LYMPHS NFR BLD MANUAL: 2 % (ref 0–0)
WBC NRBC COR # BLD AUTO: 6.3 K/UL (ref 4.3–11)

## 2022-04-28 RX ADMIN — FOLIC ACID SCH MG: 1 TABLET ORAL at 08:44

## 2022-04-28 RX ADMIN — MUPIROCIN SCH GM: 20 OINTMENT TOPICAL at 08:44

## 2022-04-28 RX ADMIN — PANTOPRAZOLE SODIUM SCH MG: 40 TABLET, DELAYED RELEASE ORAL at 08:44

## 2022-04-28 RX ADMIN — Medication SCH G: at 21:01

## 2022-04-28 RX ADMIN — MUPIROCIN SCH GM: 20 OINTMENT TOPICAL at 21:01

## 2022-04-28 RX ADMIN — Medication SCH MG: at 08:44

## 2022-04-28 RX ADMIN — SODIUM CHLORIDE PRN MLS/HR: 9 INJECTION, SOLUTION INTRAVENOUS at 00:20

## 2022-04-28 RX ADMIN — Medication SCH G: at 10:39

## 2022-04-28 RX ADMIN — ACETAMINOPHEN PRN MG: 325 TABLET ORAL at 21:01

## 2022-04-28 RX ADMIN — Medication SCH G: at 16:37

## 2022-04-28 RX ADMIN — CHLORDIAZEPOXIDE HYDROCHLORIDE SCH MG: 25 CAPSULE ORAL at 08:44

## 2022-04-28 RX ADMIN — Medication SCH G: at 12:02

## 2022-04-28 RX ADMIN — SODIUM PHOSPHATE, DIBASIC, ANHYDROUS, POTASSIUM PHOSPHATE, MONOBASIC, AND SODIUM PHOSPHATE, MONOBASIC, MONOHYDRATE SCH MG: 852; 155; 130 TABLET, COATED ORAL at 23:17

## 2022-04-28 RX ADMIN — DEXTROSE MONOHYDRATE SCH MLS/HR: 50 INJECTION, SOLUTION INTRAVENOUS at 20:52

## 2022-04-28 RX ADMIN — SODIUM CHLORIDE PRN MLS/HR: 9 INJECTION, SOLUTION INTRAVENOUS at 10:44

## 2022-04-28 RX ADMIN — CHLORDIAZEPOXIDE HYDROCHLORIDE SCH MG: 25 CAPSULE ORAL at 16:37

## 2022-04-28 NOTE — NUR
MS RN OPENING NOTE



Patient in bed, asleep. A/O x 2-3. On room air, breathing evenly and unlabored. No SOB or 
s/s of distress noted. IV access on RAC #20 infusing NS at 125 ml/hr and LFA #22 SL intact 
and patent. Safety precautions in place: bed in low, locked position; siderails up x 2; call 
light within reach. Will continue to monitor.

## 2022-04-28 NOTE — NUR
MS RN CLOSING NOTE



Patient in bed, resting. A/O x 2-3. On room air, breathing evenly and unlabored. No SOB or 
s/s of distress noted. IV access on RAC #20 and LFA #22 SL, both intact and patent. All 
needs attended to. Due meds given. Safety precautions maintained: bed in low, locked 
position; siderails up x 2; call light within reach. Will endorse to night shift nurse for 
FLORA.

## 2022-04-28 NOTE — NUR
RN NOTE



Patient's SPO2 is at 80-81% on room air. O2 given at 3 LPM[address] Dr. Knowles notified and 
ordered to stop IV fluid for now and ordered a CXR. 

-------------------------------------------------------------------------------

Addendum: 04/28/22 at 1855 by RACHAEL GERARDO RN

-------------------------------------------------------------------------------

ADD: Patient denies any SOB or discomfort at this time.

## 2022-04-28 NOTE — NUR
RN CLOSING NOTES



PATIENT SLEEPING IN BED A/OX3 South Sudanese SPEAKING. ROOM AIR BERONICA WELL.NO SIGN SOB DISTRESS 
NOTED.LFA #22G  AND RAC 220G.INTACT/PATNT.  SAFETY PRECAUTIONS IMPLEMENTED; BED LOCKED IN 
LOW POSITION. SIDE RAILSX3, CALL LIGHT WITHIN REACH.WILL ENDORSE TO ONCOMING SHIFT.

## 2022-04-28 NOTE — NUR
RN OPENING NOTES



RECEIVED PATIENT IN BED A/OX2,PERIOD OF CONFUSION Irish SPEAKING ON ROOM AIR BERONICA WELL. NO 
SIGN SOB/DISTRESS NOTED.LFA #22G AND RAC #20G, INTACT AND PATENT. SAFETY MEASURE 
IMPLEMENTED; BED LOCKED IN LOW POSITION; SIDE RAILSX3 UP.CALL LIGHT WITHIN REACH; WILL 
CONTINUE TO MONITOR.

## 2022-04-29 VITALS — DIASTOLIC BLOOD PRESSURE: 62 MMHG | SYSTOLIC BLOOD PRESSURE: 85 MMHG

## 2022-04-29 VITALS — SYSTOLIC BLOOD PRESSURE: 88 MMHG | DIASTOLIC BLOOD PRESSURE: 55 MMHG

## 2022-04-29 VITALS — SYSTOLIC BLOOD PRESSURE: 110 MMHG | DIASTOLIC BLOOD PRESSURE: 81 MMHG

## 2022-04-29 VITALS — DIASTOLIC BLOOD PRESSURE: 71 MMHG | SYSTOLIC BLOOD PRESSURE: 95 MMHG

## 2022-04-29 VITALS — SYSTOLIC BLOOD PRESSURE: 95 MMHG | DIASTOLIC BLOOD PRESSURE: 71 MMHG

## 2022-04-29 VITALS — SYSTOLIC BLOOD PRESSURE: 120 MMHG | DIASTOLIC BLOOD PRESSURE: 89 MMHG

## 2022-04-29 LAB
ALBUMIN SERPL BCP-MCNC: 1.8 G/DL (ref 3.4–5)
ALP SERPL-CCNC: 123 U/L (ref 46–116)
ALT SERPL W P-5'-P-CCNC: 94 U/L (ref 12–78)
AST SERPL W P-5'-P-CCNC: 579 U/L (ref 15–37)
BASOPHILS # BLD AUTO: 0 K/UL (ref 0–0.2)
BASOPHILS NFR BLD AUTO: 0.3 % (ref 0–2)
BILIRUB DIRECT SERPL-MCNC: 2.1 MG/DL (ref 0–0.2)
BILIRUB SERPL-MCNC: 3.5 MG/DL (ref 0.2–1)
BUN SERPL-MCNC: 5 MG/DL (ref 7–18)
CALCIUM SERPL-MCNC: 7.3 MG/DL (ref 8.5–10.1)
CHLORIDE SERPL-SCNC: 98 MMOL/L (ref 98–107)
CO2 SERPL-SCNC: 27 MMOL/L (ref 21–32)
CREAT SERPL-MCNC: 0.7 MG/DL (ref 0.6–1.3)
EOSINOPHIL NFR BLD AUTO: 1 % (ref 0–6)
GLUCOSE SERPL-MCNC: 110 MG/DL (ref 74–106)
HCT VFR BLD AUTO: 34 % (ref 33–45)
HGB BLD-MCNC: 11.5 G/DL (ref 11.5–14.8)
LYMPHOCYTES NFR BLD AUTO: 1.8 K/UL (ref 0.8–4.8)
LYMPHOCYTES NFR BLD AUTO: 29.7 % (ref 20–44)
LYMPHOCYTES NFR BLD MANUAL: 27 % (ref 16–48)
MAGNESIUM SERPL-MCNC: 1.8 MG/DL (ref 1.8–2.4)
MCHC RBC AUTO-ENTMCNC: 34 G/DL (ref 31–36)
MCV RBC AUTO: 109 FL (ref 82–100)
MONOCYTES NFR BLD AUTO: 0.2 K/UL (ref 0.1–1.3)
MONOCYTES NFR BLD AUTO: 3.9 % (ref 2–12)
MONOCYTES NFR BLD MANUAL: 4 % (ref 0–11)
NEUTROPHILS # BLD AUTO: 4.1 K/UL (ref 1.8–8.9)
NEUTROPHILS NFR BLD AUTO: 65.1 % (ref 43–81)
NEUTS BAND NFR BLD MANUAL: 1 % (ref 0–5)
NEUTS SEG NFR BLD MANUAL: 68 % (ref 42–76)
PHOSPHATE SERPL-MCNC: 2.1 MG/DL (ref 2.5–4.9)
PLATELET # BLD AUTO: 55 K/UL (ref 150–450)
POTASSIUM SERPL-SCNC: 2.9 MMOL/L (ref 3.5–5.1)
PROT SERPL-MCNC: 5.7 G/DL (ref 6.4–8.2)
RBC # BLD AUTO: 3.13 MIL/UL (ref 4–5.2)
SODIUM SERPL-SCNC: 132 MMOL/L (ref 136–145)
WBC NRBC COR # BLD AUTO: 6.2 K/UL (ref 4.3–11)

## 2022-04-29 PROCEDURE — 0DB68ZX EXCISION OF STOMACH, VIA NATURAL OR ARTIFICIAL OPENING ENDOSCOPIC, DIAGNOSTIC: ICD-10-PCS | Performed by: INTERNAL MEDICINE

## 2022-04-29 RX ADMIN — SODIUM PHOSPHATE, DIBASIC, ANHYDROUS, POTASSIUM PHOSPHATE, MONOBASIC, AND SODIUM PHOSPHATE, MONOBASIC, MONOHYDRATE SCH MG: 852; 155; 130 TABLET, COATED ORAL at 06:00

## 2022-04-29 RX ADMIN — SUCRALFATE SCH G: 1 SUSPENSION ORAL at 17:57

## 2022-04-29 RX ADMIN — Medication SCH G: at 11:48

## 2022-04-29 RX ADMIN — SUCRALFATE SCH G: 1 SUSPENSION ORAL at 21:37

## 2022-04-29 RX ADMIN — ACETAMINOPHEN PRN MG: 325 TABLET ORAL at 17:58

## 2022-04-29 RX ADMIN — SODIUM PHOSPHATE, DIBASIC, ANHYDROUS, POTASSIUM PHOSPHATE, MONOBASIC, AND SODIUM PHOSPHATE, MONOBASIC, MONOHYDRATE SCH MG: 852; 155; 130 TABLET, COATED ORAL at 11:44

## 2022-04-29 RX ADMIN — CHLORDIAZEPOXIDE HYDROCHLORIDE SCH MG: 25 CAPSULE ORAL at 17:57

## 2022-04-29 RX ADMIN — MUPIROCIN SCH GM: 20 OINTMENT TOPICAL at 20:54

## 2022-04-29 RX ADMIN — Medication SCH G: at 08:50

## 2022-04-29 RX ADMIN — CHLORDIAZEPOXIDE HYDROCHLORIDE SCH MG: 25 CAPSULE ORAL at 08:50

## 2022-04-29 RX ADMIN — SODIUM PHOSPHATE, DIBASIC, ANHYDROUS, POTASSIUM PHOSPHATE, MONOBASIC, AND SODIUM PHOSPHATE, MONOBASIC, MONOHYDRATE SCH MG: 852; 155; 130 TABLET, COATED ORAL at 23:06

## 2022-04-29 RX ADMIN — Medication SCH MG: at 08:50

## 2022-04-29 RX ADMIN — SODIUM PHOSPHATE, DIBASIC, ANHYDROUS, POTASSIUM PHOSPHATE, MONOBASIC, AND SODIUM PHOSPHATE, MONOBASIC, MONOHYDRATE SCH MG: 852; 155; 130 TABLET, COATED ORAL at 17:57

## 2022-04-29 RX ADMIN — SUCRALFATE SCH G: 1 SUSPENSION ORAL at 11:44

## 2022-04-29 RX ADMIN — MUPIROCIN SCH GM: 20 OINTMENT TOPICAL at 09:19

## 2022-04-29 RX ADMIN — Medication SCH G: at 21:37

## 2022-04-29 RX ADMIN — PANTOPRAZOLE SODIUM SCH MG: 40 TABLET, DELAYED RELEASE ORAL at 08:50

## 2022-04-29 RX ADMIN — FOLIC ACID SCH MG: 1 TABLET ORAL at 08:50

## 2022-04-29 RX ADMIN — DEXTROSE MONOHYDRATE SCH MLS/HR: 50 INJECTION, SOLUTION INTRAVENOUS at 20:53

## 2022-04-29 RX ADMIN — Medication SCH G: at 17:30

## 2022-04-29 NOTE — NUR
RN NOTES:

IV CANNULA WAS ACCIDENTALLY OUT, AGREED FOR REINSERTION AT 2200, IV CANNULA REINSERTED ON 
THE RH G#22, 1 ATTEMPT WITH GOOD BACK FLOW.ATB/IV COMPLETED GIVEN.

## 2022-04-29 NOTE — NUR
MS RN CLOSING NOTE



Patient in bed, resting. A/O x 2-3. On room air, breathing evenly and unlabored. No SOB or 
s/s of distress noted. IV access on RAC #20 and LFA #22 SL, both intact and patent.0622 pt 
was pick from OR for EGD procedure.ROOM CHANGE PT from room #326-2 going to room #324-1.will 
endorse to next shift.

## 2022-04-29 NOTE — NUR
MS RN CLOSING NOTE



PATIENT IN BED, RESTING. A/O X 3. PATIENT IS IN NASAL CANNULA, 3L OF OXYGEN. BREATHING 
EVENLY AND UNLABORED. IV ACCESS ON RAC 2OG AND LFA 22G SL, CLEAR AND INTACT. PATIENT HAD 
FEVER (100.7 TEMPERATURE). SHE WAS GIVEN TYLENOL, AND HER TEMPERATURE WENT DOWN TO 98.9. 
SAFETY PRECAUTIONS IN PLACE: BED IN LOWEST AND LOCKED POSITION, SIDE RAILS UP X 2, AND 
BRAKES ON. TABLE AND CALL LIGHT WITHIN REACH. WILL ENDORSED TO ONCOMING SHIFT FOR FLORA.

## 2022-04-29 NOTE — NUR
RN MS NOTES

RECEIVED PT FROM O.R. STAFF VIA BED, PT IS AWAKE, ALERT AND ORIENTED, NO COMPLAINT OF PAIN, 
VITALS TAKEN AND RECORDED, NO COMPLAINT OF SOB, POST OP ORDERS RECEIVED, NOTED AND CARRIED 
OUT, WILL CONTINUE TO MONITOR. VS BP 95/71  T 98.4 O2 SAT 95% AT 4LPM VIA N/C.

## 2022-04-29 NOTE — NUR
RN NOTES:

SUCRALFATE PER G-TUBE NOT GIVEN, PATIENT IS TAKING IT ORALLY.

-SHE REQUEST FOR SLEEPING PILL, AMBIEN PRN GIVEN.

## 2022-04-29 NOTE — NUR
SS Note:



SS received consult for ETOH.

Pt. Is a 31-year-old female who demonstrates adequate insight to the reason for 
hospitalization. Per EMR, she came in for abdominal pain due to alcohol intake. Pt. was 
oriented x3, alert, and cooperative. During interview, pt. was capable of following 
directions and appeared unkempt. Pt.s speech was at a normal rate and pt.s mood was 
elevated. Pt. reported no hx of mental health, denies suicidal ideation, or homicidal 
ideation. Pt. denies auditory hallucinations, visual hallucinations, paranoia, or delusions. 
SW explored pt.s living situation. Pt. stated that she drinks alcohol everyday [1 beer a 
day]. Pt. has never been to rehab, SW suggested rehab and pt. rejected. Pt. was very short 
with her answers. Per pt., she lives with her friend, pt. can go back upon discharge. 



Plan: SW provided available resources and pt. accepted. Upon discharge, per pt., she will 
return to home with her friend. 



Resources Provided:



Substance Abuse resources:



[facility] Substance Abuse Self-Helpline ([facility]) [phone]; [facility] [address] 
[facility]. [place] [address] [phone]; [facility] [address] 
[facility] [address] [phone]; [facility] [address] 
[facility] [id number] [phone]; [facility] [address] [facility] [id number] [phone];  [facility] Treatment [facility] [address] [facility] [address] [phone]; [facility] [address] [facility] [address] [phone]; [facility] Substance Abuse Helpline([facility] [phone]; [facility]  [phone]; [facility] [phone] [place]; [facility]  
[phone] [place]; [facility]  [phone] [place]; [facility] [phone] [place]; [facility]  [phone] [place]; 
[place] [phone] [place]; [facility] [phone] [place]; [facility], [facility]. [phone] [place]let [place]; Alcoholics Anonymous [phone]-SFV; 
[place] [phone]; M[facility] [phone]; Narcotics Anonymous 
www.na.org;

## 2022-04-29 NOTE — NUR
RN NOTES:

RECEIVED AWAKE ON BED, A/OX2-3, SHE WAS CONVERSANT , WATCHING Solomon Islander MOVIE IN HER PHONE, 
ASKING FOR SOMETHING TO SLEEP TO BE GIVEN LATER ON,PER ENDORSEMENT SHE IS TAKING A NAP IN 
BETWEEN.SHE IS REMOVING HER O2 INHALATION, RN EXPLAINED TO HER SHE NEEDS IT, IV CANNULA ON 
THE LFAG#22, SHE WAS FEVERISH IN THE MORNING SHIFT, GIVEN TYLENOL, NOW LATEST TEMP-98.9, ON 
ROCEPHIN ATB, WILL CONTINUE TO MONITOR, ORIENTED TO UNIT AND STAFF, FALL, SAFETY AND 
ASPIRATION PRECAUTION OBSERVED.

## 2022-04-30 VITALS — DIASTOLIC BLOOD PRESSURE: 73 MMHG | SYSTOLIC BLOOD PRESSURE: 119 MMHG

## 2022-04-30 LAB
BASOPHILS # BLD AUTO: 0 K/UL (ref 0–0.2)
BASOPHILS NFR BLD AUTO: 0.4 % (ref 0–2)
BUN SERPL-MCNC: 4 MG/DL (ref 7–18)
CALCIUM SERPL-MCNC: 7.6 MG/DL (ref 8.5–10.1)
CHLORIDE SERPL-SCNC: 98 MMOL/L (ref 98–107)
CO2 SERPL-SCNC: 30 MMOL/L (ref 21–32)
CREAT SERPL-MCNC: 0.7 MG/DL (ref 0.6–1.3)
EOSINOPHIL NFR BLD AUTO: 1.1 % (ref 0–6)
GLUCOSE SERPL-MCNC: 124 MG/DL (ref 74–106)
HCT VFR BLD AUTO: 33 % (ref 33–45)
HGB BLD-MCNC: 11 G/DL (ref 11.5–14.8)
LYMPHOCYTES NFR BLD AUTO: 2.1 K/UL (ref 0.8–4.8)
LYMPHOCYTES NFR BLD AUTO: 32.6 % (ref 20–44)
LYMPHOCYTES NFR BLD MANUAL: 29 % (ref 16–48)
MAGNESIUM SERPL-MCNC: 1.8 MG/DL (ref 1.8–2.4)
MCHC RBC AUTO-ENTMCNC: 33 G/DL (ref 31–36)
MCV RBC AUTO: 110 FL (ref 82–100)
METAMYELOCYTES NFR BLD MANUAL: 1 % (ref 0–0)
MONOCYTES NFR BLD AUTO: 0.4 K/UL (ref 0.1–1.3)
MONOCYTES NFR BLD AUTO: 5.7 % (ref 2–12)
MONOCYTES NFR BLD MANUAL: 4 % (ref 0–11)
MYELOCYTES NFR BLD MANUAL: 1 % (ref 0–0)
NEUTROPHILS # BLD AUTO: 3.9 K/UL (ref 1.8–8.9)
NEUTROPHILS NFR BLD AUTO: 60.2 % (ref 43–81)
NEUTS BAND NFR BLD MANUAL: 2 % (ref 0–5)
NEUTS SEG NFR BLD MANUAL: 63 % (ref 42–76)
PHOSPHATE SERPL-MCNC: 2.7 MG/DL (ref 2.5–4.9)
PLATELET # BLD AUTO: 58 K/UL (ref 150–450)
POTASSIUM SERPL-SCNC: 2.9 MMOL/L (ref 3.5–5.1)
RBC # BLD AUTO: 3.01 MIL/UL (ref 4–5.2)
SODIUM SERPL-SCNC: 135 MMOL/L (ref 136–145)
WBC NRBC COR # BLD AUTO: 6.5 K/UL (ref 4.3–11)

## 2022-04-30 RX ADMIN — POTASSIUM CHLORIDE SCH MEQ: 1500 TABLET, EXTENDED RELEASE ORAL at 09:52

## 2022-04-30 RX ADMIN — MUPIROCIN SCH GM: 20 OINTMENT TOPICAL at 09:03

## 2022-04-30 RX ADMIN — POTASSIUM CHLORIDE SCH MEQ: 1500 TABLET, EXTENDED RELEASE ORAL at 11:01

## 2022-04-30 RX ADMIN — CHLORDIAZEPOXIDE HYDROCHLORIDE SCH MG: 25 CAPSULE ORAL at 08:49

## 2022-04-30 RX ADMIN — POTASSIUM CHLORIDE SCH MEQ: 1500 TABLET, EXTENDED RELEASE ORAL at 08:49

## 2022-04-30 RX ADMIN — SODIUM PHOSPHATE, DIBASIC, ANHYDROUS, POTASSIUM PHOSPHATE, MONOBASIC, AND SODIUM PHOSPHATE, MONOBASIC, MONOHYDRATE SCH MG: 852; 155; 130 TABLET, COATED ORAL at 05:17

## 2022-04-30 RX ADMIN — SODIUM PHOSPHATE, DIBASIC, ANHYDROUS, POTASSIUM PHOSPHATE, MONOBASIC, AND SODIUM PHOSPHATE, MONOBASIC, MONOHYDRATE SCH MG: 852; 155; 130 TABLET, COATED ORAL at 12:18

## 2022-04-30 RX ADMIN — PANTOPRAZOLE SODIUM SCH MG: 40 TABLET, DELAYED RELEASE ORAL at 08:49

## 2022-04-30 RX ADMIN — SUCRALFATE SCH G: 1 SUSPENSION ORAL at 08:49

## 2022-04-30 RX ADMIN — FOLIC ACID SCH MG: 1 TABLET ORAL at 08:49

## 2022-04-30 RX ADMIN — SUCRALFATE SCH G: 1 SUSPENSION ORAL at 12:18

## 2022-04-30 RX ADMIN — Medication SCH MG: at 08:49

## 2022-04-30 NOTE — NUR
received pt. in am alert and oriented x2-3.vs stable. seems a little 
forgetful.appetite.given multiple doses of potassium as potassium level low in am.dr. spears in and dc order given.waiting for potassium supplements to be given.hep lock 
out. involved in dc.pt. called friend for discharge ,but no show,so sent home 
by [employer].all papers signed and given rxs.

## 2022-04-30 NOTE — NUR
RN NOTES:

STILL ASLEEP, NO PAIN OR DISCOMFORT IN THE MORNING, CALLS AND NEEDS ATTENDED, FOR LABS IN 
THE MORNING, NO SOB OR SIGN OF RESPIRATORY DISTRESS SPO2-92% REMIAN ON O2 AT 2L/MIN VIA NC, 
ENDORSED FOR CONTINUITY OF CARE.

## 2022-07-16 ENCOUNTER — HOSPITAL ENCOUNTER (INPATIENT)
Dept: HOSPITAL 54 - ER | Age: 32
LOS: 2 days | Discharge: HOME | DRG: 896 | End: 2022-07-18
Attending: NURSE PRACTITIONER | Admitting: NURSE PRACTITIONER
Payer: COMMERCIAL

## 2022-07-16 VITALS — SYSTOLIC BLOOD PRESSURE: 155 MMHG | DIASTOLIC BLOOD PRESSURE: 96 MMHG

## 2022-07-16 VITALS — HEIGHT: 66 IN | BODY MASS INDEX: 23.46 KG/M2 | WEIGHT: 146 LBS

## 2022-07-16 DIAGNOSIS — S01.511A: ICD-10-CM

## 2022-07-16 DIAGNOSIS — N39.0: ICD-10-CM

## 2022-07-16 DIAGNOSIS — Z20.822: ICD-10-CM

## 2022-07-16 DIAGNOSIS — K29.20: ICD-10-CM

## 2022-07-16 DIAGNOSIS — I95.9: ICD-10-CM

## 2022-07-16 DIAGNOSIS — E83.51: ICD-10-CM

## 2022-07-16 DIAGNOSIS — E87.2: ICD-10-CM

## 2022-07-16 DIAGNOSIS — E87.6: ICD-10-CM
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
## 2022-07-16 DIAGNOSIS — G92.8: ICD-10-CM
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
## 2022-07-16 DIAGNOSIS — F19.10: ICD-10-CM
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
## 2022-07-16 DIAGNOSIS — I21.4: ICD-10-CM

## 2022-07-16 DIAGNOSIS — K70.10: ICD-10-CM

## 2022-07-16 DIAGNOSIS — Z59.00: ICD-10-CM

## 2022-07-16 DIAGNOSIS — E86.0: ICD-10-CM

## 2022-07-16 DIAGNOSIS — F10.239: Primary | ICD-10-CM

## 2022-07-16 DIAGNOSIS — M62.82: ICD-10-CM

## 2022-07-16 LAB
ALBUMIN SERPL BCP-MCNC: 3.7 G/DL (ref 3.4–5)
ALP SERPL-CCNC: 83 U/L (ref 46–116)
ALT SERPL W P-5'-P-CCNC: 17 U/L (ref 12–78)
AMMONIA PLAS-SCNC: 23 UMOL/L (ref 11–32)
APAP SERPL-MCNC: < 10 UG/ML (ref 10–30)
AST SERPL W P-5'-P-CCNC: 132 U/L (ref 15–37)
BASOPHILS # BLD AUTO: 0 K/UL (ref 0–0.2)
BASOPHILS NFR BLD AUTO: 0.3 % (ref 0–2)
BILIRUB DIRECT SERPL-MCNC: 0.3 MG/DL (ref 0–0.2)
BILIRUB SERPL-MCNC: 0.6 MG/DL (ref 0.2–1)
BILIRUB UR QL STRIP: (no result)
BUN SERPL-MCNC: 9 MG/DL (ref 7–18)
CALCIUM SERPL-MCNC: 8.6 MG/DL (ref 8.5–10.1)
CHLORIDE SERPL-SCNC: 99 MMOL/L (ref 98–107)
CK SERPL-CCNC: 171 U/L (ref 26–192)
CO2 SERPL-SCNC: 20 MMOL/L (ref 21–32)
COLOR UR: (no result)
CREAT SERPL-MCNC: 0.8 MG/DL (ref 0.6–1.3)
EOSINOPHIL NFR BLD AUTO: 0.2 % (ref 0–6)
ETHANOL SERPL-MCNC: 13 MG/DL (ref 0–0)
GLUCOSE SERPL-MCNC: 157 MG/DL (ref 74–106)
GLUCOSE UR STRIP-MCNC: (no result) MG/DL
HCT VFR BLD AUTO: 43 % (ref 33–45)
HGB BLD-MCNC: 14.4 G/DL (ref 11.5–14.8)
LEUKOCYTE ESTERASE UR QL STRIP: NEGATIVE
LYMPHOCYTES NFR BLD AUTO: 0.6 K/UL (ref 0.8–4.8)
LYMPHOCYTES NFR BLD AUTO: 6.3 % (ref 20–44)
LYMPHOCYTES NFR BLD MANUAL: 11 % (ref 16–48)
MAGNESIUM SERPL-MCNC: 1.9 MG/DL (ref 1.8–2.4)
MCHC RBC AUTO-ENTMCNC: 34 G/DL (ref 31–36)
MCV RBC AUTO: 106 FL (ref 82–100)
MONOCYTES NFR BLD AUTO: 0.4 K/UL (ref 0.1–1.3)
MONOCYTES NFR BLD AUTO: 4.4 % (ref 2–12)
MONOCYTES NFR BLD MANUAL: 2 % (ref 0–11)
NEUTROPHILS # BLD AUTO: 8.9 K/UL (ref 1.8–8.9)
NEUTROPHILS NFR BLD AUTO: 88.8 % (ref 43–81)
NEUTS SEG NFR BLD MANUAL: 87 % (ref 42–76)
NITRITE UR QL STRIP: NEGATIVE
PH UR STRIP: 5 [PH] (ref 5–8)
PHOSPHATE SERPL-MCNC: 2.8 MG/DL (ref 2.5–4.9)
PLATELET # BLD AUTO: 174 K/UL (ref 150–450)
POTASSIUM SERPL-SCNC: 3.1 MMOL/L (ref 3.5–5.1)
PROT SERPL-MCNC: 8.9 G/DL (ref 6.4–8.2)
PROT UR QL STRIP: 100 MG/DL
RBC # BLD AUTO: 4.02 MIL/UL (ref 4–5.2)
RBC #/AREA URNS HPF: (no result) /HPF (ref 0–2)
SODIUM SERPL-SCNC: 137 MMOL/L (ref 136–145)
TSH SERPL DL<=0.005 MIU/L-ACNC: 1.63 UIU/ML (ref 0.36–3.74)
UROBILINOGEN UR STRIP-MCNC: 0.2 EU/DL
WBC #/AREA URNS HPF: (no result) /HPF (ref 0–3)
WBC NRBC COR # BLD AUTO: 10 K/UL (ref 4.3–11)

## 2022-07-16 PROCEDURE — G0480 DRUG TEST DEF 1-7 CLASSES: HCPCS

## 2022-07-16 PROCEDURE — A6403 STERILE GAUZE>16 <= 48 SQ IN: HCPCS

## 2022-07-16 PROCEDURE — C9803 HOPD COVID-19 SPEC COLLECT: HCPCS

## 2022-07-16 PROCEDURE — G0378 HOSPITAL OBSERVATION PER HR: HCPCS

## 2022-07-16 PROCEDURE — 0HQ1XZZ REPAIR FACE SKIN, EXTERNAL APPROACH: ICD-10-PCS | Performed by: EMERGENCY MEDICINE

## 2022-07-16 RX ADMIN — SODIUM CHLORIDE PRN MLS/HR: 9 INJECTION, SOLUTION INTRAVENOUS at 23:19

## 2022-07-16 SDOH — ECONOMIC STABILITY - HOUSING INSECURITY: HOMELESSNESS UNSPECIFIED: Z59.00

## 2022-07-16 NOTE — NUR
TELE RN ADMISSION NOTES

RECEIVED FROM ER PER GREGG THIS 33 YO FEMALE,A/O X4,Scottish SPEAKING,ABLE TO UNDERSTAND 
ENGLISH,S/P FALL AT HOME DUE TO ETOH ABUSE,SUSTAINED INJURY ON FACE,LACERATION BELOW THE 
NOSE SUTURED IN ER,ABRASION ON RIGHT CHEEK AND RIGHT CHIN,OPEN TO AIR.ABLE TO AMBULATE WITH 
STEADY GAIT,WITH SALINE LOCK LEFT AC INTACT AND PATENT.NO KNOWN ALLERGY,WITH KNOWN HISTORY 
OF BILATERAL BREAST SILICONE IMPLANT,OTHERWISE NO OTHER MEDICAL HISTORY,CALL LIGHT IN 
REACH,NEEDS ANTICIPATED.

## 2022-07-16 NOTE — NUR
REYNA FROM THE STREETS FOR MECHANICAL TRIP AND FALL, ABRASIONS TO FACE -KO 
ADMITS TO ETOH. C/O LEFT KNEE PAIN. TDAP NOT UTD. PATIENT ALERT AND ORIENTED 
X3. AMBULATORY WITH NON LABORED BREATHING IN BED 10 ON AWAITING MD MEYERS.

## 2022-07-16 NOTE — NUR
RECEIVED PT IN ER BED 10, PT RESTING COMFORTABLY IN BED. CONNECTED TO MONITOR. 
ADMITS TO FACIAL PAIN 5/10 ON PAIN SCALE, MD AWARE. AWAITING MD ORDERS

## 2022-07-16 NOTE — NUR
TELE RN NOTES

SR-62 ON TELE MONITOR,IVF STARTED NS AT 75ML/HR RATE,INFUSING VIA IV PUMP ON LEFT AC.

## 2022-07-17 VITALS — SYSTOLIC BLOOD PRESSURE: 137 MMHG | DIASTOLIC BLOOD PRESSURE: 100 MMHG

## 2022-07-17 VITALS — DIASTOLIC BLOOD PRESSURE: 76 MMHG | SYSTOLIC BLOOD PRESSURE: 129 MMHG

## 2022-07-17 VITALS — SYSTOLIC BLOOD PRESSURE: 136 MMHG | DIASTOLIC BLOOD PRESSURE: 89 MMHG

## 2022-07-17 VITALS — SYSTOLIC BLOOD PRESSURE: 160 MMHG | DIASTOLIC BLOOD PRESSURE: 98 MMHG

## 2022-07-17 VITALS — SYSTOLIC BLOOD PRESSURE: 155 MMHG | DIASTOLIC BLOOD PRESSURE: 109 MMHG

## 2022-07-17 VITALS — SYSTOLIC BLOOD PRESSURE: 134 MMHG | DIASTOLIC BLOOD PRESSURE: 89 MMHG

## 2022-07-17 VITALS — DIASTOLIC BLOOD PRESSURE: 85 MMHG | SYSTOLIC BLOOD PRESSURE: 135 MMHG

## 2022-07-17 VITALS — DIASTOLIC BLOOD PRESSURE: 112 MMHG | SYSTOLIC BLOOD PRESSURE: 169 MMHG

## 2022-07-17 LAB
BASOPHILS # BLD AUTO: 0 K/UL (ref 0–0.2)
BASOPHILS NFR BLD AUTO: 0.5 % (ref 0–2)
BUN SERPL-MCNC: 3 MG/DL (ref 7–18)
CALCIUM SERPL-MCNC: 8.2 MG/DL (ref 8.5–10.1)
CHLORIDE SERPL-SCNC: 100 MMOL/L (ref 98–107)
CK SERPL-CCNC: 170 U/L (ref 26–192)
CO2 SERPL-SCNC: 26 MMOL/L (ref 21–32)
CREAT SERPL-MCNC: 0.5 MG/DL (ref 0.6–1.3)
EOSINOPHIL NFR BLD AUTO: 1.6 % (ref 0–6)
GLUCOSE SERPL-MCNC: 104 MG/DL (ref 74–106)
HCT VFR BLD AUTO: 39 % (ref 33–45)
HGB BLD-MCNC: 13.4 G/DL (ref 11.5–14.8)
LYMPHOCYTES NFR BLD AUTO: 0.9 K/UL (ref 0.8–4.8)
LYMPHOCYTES NFR BLD AUTO: 24.2 % (ref 20–44)
MAGNESIUM SERPL-MCNC: 2 MG/DL (ref 1.8–2.4)
MCHC RBC AUTO-ENTMCNC: 34 G/DL (ref 31–36)
MCV RBC AUTO: 105 FL (ref 82–100)
MONOCYTES NFR BLD AUTO: 0.3 K/UL (ref 0.1–1.3)
MONOCYTES NFR BLD AUTO: 8.5 % (ref 2–12)
NEUTROPHILS # BLD AUTO: 2.5 K/UL (ref 1.8–8.9)
NEUTROPHILS NFR BLD AUTO: 65.2 % (ref 43–81)
PHOSPHATE SERPL-MCNC: 2.9 MG/DL (ref 2.5–4.9)
PLATELET # BLD AUTO: 117 K/UL (ref 150–450)
POTASSIUM SERPL-SCNC: 3.1 MMOL/L (ref 3.5–5.1)
RBC # BLD AUTO: 3.74 MIL/UL (ref 4–5.2)
SODIUM SERPL-SCNC: 135 MMOL/L (ref 136–145)
WBC NRBC COR # BLD AUTO: 3.9 K/UL (ref 4.3–11)

## 2022-07-17 RX ADMIN — FOLIC ACID SCH MG: 1 TABLET ORAL at 08:38

## 2022-07-17 RX ADMIN — SODIUM CHLORIDE PRN MLS/HR: 9 INJECTION, SOLUTION INTRAVENOUS at 17:36

## 2022-07-17 RX ADMIN — PANTOPRAZOLE SODIUM SCH MG: 40 TABLET, DELAYED RELEASE ORAL at 07:38

## 2022-07-17 RX ADMIN — THERA TABS SCH UDTAB: TAB at 08:38

## 2022-07-17 RX ADMIN — Medication SCH MG: at 08:38

## 2022-07-17 NOTE — NUR
RN NOTE

PT AWAKE IN BED, A/OX4, ABLE TO VERBALIZE ALL NEEDS. DENIES PAIN AT THIS TIME. DENIES N/V. 
RESPIRATIONS EVEN/UNLABORED. IV ACCESS: L-AC #20G INTACT/PATENT, INFUSING NS @75ML/HR. PT 
ABLE TO AMBULATE TO BR WITH STEADY GAIT. NO ACUTE DISTRESS NOTED. SAFETY INSTRUCTIONS 
REINFORCED AND PT VERBALIZED UNDERSTANDING. WILL CONT TO MONITOR.

## 2022-07-17 NOTE — NUR
TELE RN NOTES

/98 TAKEN MANUALLY,IN A LOT OF PAIN,HOSPITALIST PIA HERNANDEZ DNP MADE AWARE,WITH NEW 
ORDER NOTED AND CARRIED OUT.

## 2022-07-17 NOTE — NUR
TELE RN OPENING NOTES:



RECEIVED PT IN BED AWAKE,American SPEAKING BUT ABLE TO SPEAK ENGLISH. A/O X3, NO SOB OR 
CARDIAC DISTRESS NOTED. ON ROOM AIR AND TOLERATING WELL. ON CARDIAC MONITOR WITH CURRENT 
READING OF SR @69BPM. NOTED WITH SKIN ISSUES (FACIAL ABRASIONS, SKIN DISCOLORATIONS) NOTED 
W/ IV ACCESS ON LAC G#20 INFUSING NS @75ML/HR PATENT AND INTACT. DENIES ANY PAIN AT THIS 
TIME. SAFETY PRECAUTIONS MAINTAINED: BED LOCKED AND IN LOWEST POSITION, SIDE RAILS UP X 2. 
CALL LIGHT IN EASY REACH FOR HELP. KEPT RESTED AND COMFORTABLE. WILL MONITOR ACCORDINGLY.

## 2022-07-17 NOTE — NUR
RN NOTES:



RECEIVED CALL FROM ED[facility] (LAB) TROP LEVEL 205, ITS TRENDING DOWN. DR KNIGHT MADE AWARE.

## 2022-07-17 NOTE — NUR
TELE RN CLOSING NOTES:



 PATIENT IN BED AWAKE,New Zealander SPEAKING BUT ABLE TO SPEAK ENGLISH. A/O X3, NO SOB OR CARDIAC 
DISTRESS NOTED. ON ROOM AIR AND TOLERATING WELL. ON CARDIAC MONITOR WITH CURRENT READING OF 
SR @69BPM. NOTED WITH SKIN ISSUES (FACIAL ABRASIONS, SKIN DISCOLORATIONS), KEPT DRY AND 
CLEAN TAMRA. NO EPISODES OF ALCOHOL WITHDRAWAL NOTED. NOTED W/ IV ACCESS ON LAC G#20 INFUSING 
NS @75ML/HR PATENT AND INTACT. DENIES ANY PAIN AT THIS TIME. SAFETY PRECAUTIONS MAINTAINED: 
BED LOCKED AND IN LOWEST POSITION, SIDE RAILS UP X 2. CALL LIGHT IN EASY REACH FOR HELP. 
KEPT RESTED AND COMFORTABLE.  ENDORSED TO NOC SHIFT FOR FLORA.

## 2022-07-17 NOTE — NUR
MS RN NOTES

LATEST /102,HR-76,DENIES PAIN AT THE MOMENT,ASYMPTOMATIC, NO N/V NOTED.ABLE TO SLEEP 
WITH NORCO IVF INFUSING WELL ON LEFT AC SALINE LOCK,INSTRUCTED  BREAKFAST WILL BE HOLD TILL 
SEEN BY HEART DOCTOR.CALL LIGHT IN REACH,NEEDS ATTENDED.

## 2022-07-18 VITALS — SYSTOLIC BLOOD PRESSURE: 137 MMHG | DIASTOLIC BLOOD PRESSURE: 75 MMHG

## 2022-07-18 VITALS — DIASTOLIC BLOOD PRESSURE: 108 MMHG | SYSTOLIC BLOOD PRESSURE: 139 MMHG

## 2022-07-18 VITALS — DIASTOLIC BLOOD PRESSURE: 110 MMHG | SYSTOLIC BLOOD PRESSURE: 169 MMHG

## 2022-07-18 LAB
BASOPHILS # BLD AUTO: 0 K/UL (ref 0–0.2)
BASOPHILS NFR BLD AUTO: 0.2 % (ref 0–2)
BUN SERPL-MCNC: 4 MG/DL (ref 7–18)
CALCIUM SERPL-MCNC: 8.7 MG/DL (ref 8.5–10.1)
CHLORIDE SERPL-SCNC: 100 MMOL/L (ref 98–107)
CO2 SERPL-SCNC: 27 MMOL/L (ref 21–32)
CREAT SERPL-MCNC: 0.5 MG/DL (ref 0.6–1.3)
EOSINOPHIL NFR BLD AUTO: 0.7 % (ref 0–6)
GLUCOSE SERPL-MCNC: 119 MG/DL (ref 74–106)
HCT VFR BLD AUTO: 40 % (ref 33–45)
HGB BLD-MCNC: 13.7 G/DL (ref 11.5–14.8)
LYMPHOCYTES NFR BLD AUTO: 1 K/UL (ref 0.8–4.8)
LYMPHOCYTES NFR BLD AUTO: 20.1 % (ref 20–44)
MAGNESIUM SERPL-MCNC: 2.2 MG/DL (ref 1.8–2.4)
MCHC RBC AUTO-ENTMCNC: 34 G/DL (ref 31–36)
MCV RBC AUTO: 105 FL (ref 82–100)
MONOCYTES NFR BLD AUTO: 0.3 K/UL (ref 0.1–1.3)
MONOCYTES NFR BLD AUTO: 7 % (ref 2–12)
NEUTROPHILS # BLD AUTO: 3.4 K/UL (ref 1.8–8.9)
NEUTROPHILS NFR BLD AUTO: 72 % (ref 43–81)
PHOSPHATE SERPL-MCNC: 3.3 MG/DL (ref 2.5–4.9)
PLATELET # BLD AUTO: 113 K/UL (ref 150–450)
POTASSIUM SERPL-SCNC: 3.2 MMOL/L (ref 3.5–5.1)
RBC # BLD AUTO: 3.85 MIL/UL (ref 4–5.2)
SODIUM SERPL-SCNC: 134 MMOL/L (ref 136–145)
WBC NRBC COR # BLD AUTO: 4.8 K/UL (ref 4.3–11)

## 2022-07-18 RX ADMIN — FOLIC ACID SCH MG: 1 TABLET ORAL at 08:28

## 2022-07-18 RX ADMIN — Medication SCH MG: at 08:28

## 2022-07-18 RX ADMIN — PANTOPRAZOLE SODIUM SCH MG: 40 TABLET, DELAYED RELEASE ORAL at 07:39

## 2022-07-18 RX ADMIN — THERA TABS SCH UDTAB: TAB at 08:28

## 2022-07-18 NOTE — NUR
WOUND CARE CONSULT: PT PRESENTS WITH FACIAL WOUNDS WHICH ARE CRUSTED, PRESENT ON ADMISSION. 
RECOMMENDATIONS MADE FOR SKIN PROTECTION AND WOUND CARE. DISCUSSED WITH NURSING STAFF AND 
SURGICAL P.A. MD IN AGREEMENT WITH PLAN OF CARE.

## 2022-07-18 NOTE — NUR
RN note 

Patient was provided with discharge instructions, verbalized understanding. IV line was 
removed , patient left the hospital by her self stating that her girlfriend waiting for her 
down stairs. Patient discharged form [facility]

## 2022-07-18 NOTE — NUR
RN OPENING NOTE

RECEIVED PT IN BED AWAKE, A/O X3, NO SOB OR CARDIAC DISTRESS NOTED. ON ROOM AIR AND 
TOLERATING WELL. ON CARDIAC DIET , SR 70. NOTED WITH SKIN ISSUES (FACIAL ABRASIONS, SKIN 
DISCOLORATIONS) IV ACCESS ON LAC G#20 INFUSING NS @75ML/HR PATENT AND INTACT. DENIES ANY 
PAIN AT THIS TIME. SAFETY PRECAUTIONS MAINTAINED: BED LOCKED AND IN LOWEST POSITION, SIDE 
RAILS UP X 2. CALL LIGHT IN EASY REACH FOR HELP. KEPT RESTED AND COMFORTABLE. WILL MONITOR 
ACCORDINGLY.

## 2022-07-18 NOTE — NUR
SS consult: 



SS Consult requested for alcohol abuse. The pt. is a 32-year-old Lao female  patient 
who was BIBRA as a direct admit from [place]. Upon SS consult, the pt. is Alert & Oriented x 4 
and makes good eye contact. The pt. appears unkempt and remains calm & cooperative 
throughout interview. Pt. denies SI/HI and denies hallucinations. The pt.s thought process 
and thought content are WNL. The pt. has Depressed mood & flat affect. Pt.s speech is WNL. 
SW explored pt.s living situation. Patient stated that she lives with her friend, Walt at 
[[address] [facility]. [place] [id number]; [phone]]. SW explored pt.s mental health 
Hx. Patient denies any diagnosis or taking any medication for her mental health. SW explored 
pt.s drug & ETOH use. Pt. denies any drug use. However, pt. tested positive for Meth and 
Cocaine. Pt. states she has been drinking beer daily for the past year. This  
provided support with motivational interviewing, along with education regarding alcohol & 
drug dependence, brief intervention and referral to treatment. Patient refused referral to a 
rehab but accepted addiction resources and stated she would join a support group and use 
resources to seek treatment.  Per pt. she is ambulatory and independent with all her ADLs. 
SW explored pt.s support system. Pt. states she has a friend, Walt. 



Plan: SW provided pt. with addiction resources and pt. accepted them. Pt. stated her friend, 
Walt can pick her up and return home[[address] [facility]. [facility] [address]; 
[phone]] with her when ready for discharge. 





ADDICTION RESOURCES

For Drugs and Alcohol

[facility] sober living Referrals For Rehabilitation once sober 

Address:[address] [place] [address]      [phone]

[facility] 

[address] [place] [address]  Phone: [phone] Detox/residential



[facility] Substance Abuse Helpline ([facility])

[phone] Outpatient, residential treatment, recovery support for youth/adults

[facility] www.[employer] [phone]

[facility] Teen programs for drug/alcohol education and support

[facility] [place]   [phone]  

[place]. Program for adults, sliding scale provides support and education

[facility] www.[facility].org  [phone]

 [place]; Detox/residential treatment programs; transition to sober living

[facility] www.cri-help.org   [phone]

 [place]; Outpatient and residential treatment programs; transition to sober living

[facility] TEL: [phone]

[facility]

[phone] [place]; Outpatient education and supportive programs for teens and adults

[facility]

www.[facility].org

[phone] [place]; Residential treatment and work program for females only

[facility]

www.[facility].org

[phone] [place]: Outpatient/residential treatment program for teens and young adults

[facility]

www.[facility].org [phone] [facility] Detox, inpatient, outpatient for adults and youth

[facility].

[phone] [place]; Outpatient programs and referrals to community residential 
programs. 

Alcoholics Anonymous

([phone] 237-3525-SFV information and meeting and scheduleswww.aa-intergroup.org

[place]

[phone] [phone] https://al-anon.org/ [place] support groups for family of alcoholics.

Marijuana Anonymous

www.madistrict6.org (331) 117-8768-SFV listing of meetings

Narcotics Anonymous

www.na.org

SOBER LIVING RESOURCES

[facility] 

www.soberhousing.net 

([phone] [phone] 

A non-profit agency that provides resources to recovery and sober living homes throughout 
[facility] Sober Living Homes:

[facility]  [phone]

[facility] [phone]

[facility] [place] [phone]

[facility] [phone]





Womens Sober Living Homes:

[facility] [phone]

[facility] [place] [facility] [place] [phone]

[facility] [phone]

[facility] [phone]



[place] Sober Living [facility]:

[facility]  [phone]



Counseling--Outpatient

[facility]

[address]

[place] [address] [phone]

(Specializes in in-depth psychotherapy for emotional distress: anxiety, depression, 
interpersonal conflicts, life transitions, childhood abuse)



[facility]

[address] [place] [address] [phone]

(Assist with solving problem marital difficulties, separation & divorce, aging parents, 
death & grief, chronic & terminal illness)



[facility]

[address] [place] [address] [phone]

(Deal with loss & grief, anxiety, marital difficulties) 



[facility]/Mental Health Services

[address]

[place] [address] [phone]

(Provide in-home mental services to people who are incapable of leaving their homes)



Organization for Needs of the Elderly

Senior Service/Resource Center

[address] [place] [address] [phone]



[facility] 

[address]

[place], [place] [id number] [phone]

Mental Health Services

Marta Camejo

 [address] [place] [address] [phone]

Services: Outpatient therapy for children, teens, young adults, adults, older adults, and 
families; Psychiatric services, medication support



Psychiatric Outpatient Services



[facility] Partial Hospitalization and Intensive Outpatient Program (Managed Care 
and [place] Only)[address].

[facility] [place] [phone]

[facility]

Partial Hospitalization and Outpatient [address].  [address]

[place], [place] [phone]

[place]

[facility] [address]

[place], [place] [phone]

[facility] Partial Hospitalization and Outpatient 
[id number] MIRIAM [phone]



Crisis and Hotline Telephone Numbers

24-Hour service unless stated 

[place] Crisis Hotlines:



[facility] Mental Health/Crisis Line........[phone]

Suicide Prevention Center (24 Hours).......[phone]

Suicide Prevention Crisis Center.......[phone] (24 Hours)

 Assaults Against Women Hotline.........387.699.1002 (24 Hours -- [facility])

Women and Children Crisis Shelter...........[phone] (24 Hours)

Child Abuse Hotline............220.463.5988 [facility] Childrens Services

[facility]ape Treatment Center (24 Hours)..........[phone]

Alcoholics Anonymous (24 Hours)..........[phone]

Cocaine Anonymous (24 Hours)............[phone]

Narcotics Anonymous (24 Hours)..........[phone]







[facility]



[address], [place] [address] [phone]

## 2022-07-18 NOTE — NUR
Drug & alcohol rehab referral: 



SW was notified by patient's nurse that the pt. has now requested rehab for drug and alcohol 
use. SANDER met with pt. and their nurse at bedside. SANDER explained to pt. that SW will refer her 
to drug and alcohol use rehab, that it may take about a week or longer for a bed to become 
available and the treatment center will reach out to her. SANDER reminded pt. that she provided 
her with addiction resources and informed her which ones she can call to see if there is a 
bed available. Pt. expressed understanding and stated she will follow up. Pt. provided her 
cell phone number: [phone]. SANDER faxed clinicals to [facility] 
[FAX:[phone] PHONE:[phone]]. SANDER will remain available as needed.

## 2022-07-18 NOTE — NUR
RN NOTE

PT HAVING HIGH BP, 169/110. REPORTED TO ON-CALL SOULEYMANE REYNOSO DNP, WITH ORDER FOR 
HYDRALAZINE 10MG IVP Q6H PRN FOR SBP>160

## 2022-07-18 NOTE — NUR
RN NOTE

PT RESTING IN BED, EASILY AROUSABLE TO STIMULI. NO C/O PAIN AT THIS TIME. NO SOB. AMBULATES 
TO BR AD DAVID, WITH STEADY GAIT. PT SLEPT WELL DURING THE NIGHT. NO WITHDRAWAL S/SX NOTED. 
TELE MONITOR READING SR, HR 98, WITH PEAKED P-WAVE. NO ACUTE DISTRESS NOTED. ALL NEEDS 
ATTENDED TO. SAFETY MEASURES MAINTAINED.